# Patient Record
Sex: FEMALE | Race: ASIAN | NOT HISPANIC OR LATINO | Employment: FULL TIME | URBAN - METROPOLITAN AREA
[De-identification: names, ages, dates, MRNs, and addresses within clinical notes are randomized per-mention and may not be internally consistent; named-entity substitution may affect disease eponyms.]

---

## 2021-02-15 ENCOUNTER — CLINICAL SUPPORT (OUTPATIENT)
Dept: OTHER | Facility: CLINIC | Age: 31
End: 2021-02-15

## 2021-02-15 DIAGNOSIS — Z00.8 ENCOUNTER FOR OTHER GENERAL EXAMINATION: ICD-10-CM

## 2021-02-17 VITALS — BODY MASS INDEX: 19.2 KG/M2 | HEIGHT: 62 IN

## 2021-02-17 LAB
HDLC SERPL-MCNC: 67 MG/DL
POC CHOLESTEROL, LDL (DOCK): 112 MG/DL
POC CHOLESTEROL, TOTAL: 199 MG/DL
POC GLUCOSE, FASTING: 101 MG/DL (ref 60–110)
TRIGL SERPL-MCNC: 101 MG/DL

## 2021-04-15 ENCOUNTER — PATIENT MESSAGE (OUTPATIENT)
Dept: RESEARCH | Facility: HOSPITAL | Age: 31
End: 2021-04-15

## 2022-03-22 ENCOUNTER — CLINICAL SUPPORT (OUTPATIENT)
Dept: OTHER | Facility: CLINIC | Age: 32
End: 2022-03-22
Payer: COMMERCIAL

## 2022-03-22 DIAGNOSIS — Z00.8 ENCOUNTER FOR OTHER GENERAL EXAMINATION: ICD-10-CM

## 2022-03-22 PROCEDURE — 99401 PREV MED CNSL INDIV APPRX 15: CPT | Mod: S$GLB,,, | Performed by: INTERNAL MEDICINE

## 2022-03-22 PROCEDURE — 82947 PR  ASSAY QUANTITATIVE,BLOOD GLUCOSE: ICD-10-PCS | Mod: QW,S$GLB,, | Performed by: INTERNAL MEDICINE

## 2022-03-22 PROCEDURE — 82947 ASSAY GLUCOSE BLOOD QUANT: CPT | Mod: QW,S$GLB,, | Performed by: INTERNAL MEDICINE

## 2022-03-22 PROCEDURE — 80061 PR  LIPID PANEL: ICD-10-PCS | Mod: QW,S$GLB,, | Performed by: INTERNAL MEDICINE

## 2022-03-22 PROCEDURE — 99401 PR PREVENT COUNSEL,INDIV,15 MIN: ICD-10-PCS | Mod: S$GLB,,, | Performed by: INTERNAL MEDICINE

## 2022-03-22 PROCEDURE — 80061 LIPID PANEL: CPT | Mod: QW,S$GLB,, | Performed by: INTERNAL MEDICINE

## 2022-03-23 VITALS — HEIGHT: 62 IN | BODY MASS INDEX: 19.2 KG/M2

## 2022-03-23 LAB
HDLC SERPL-MCNC: 61 MG/DL
POC CHOLESTEROL, LDL (DOCK): 80.4 MG/DL
POC CHOLESTEROL, TOTAL: 161 MG/DL
POC GLUCOSE, FASTING: 92 MG/DL (ref 60–110)
TRIGL SERPL-MCNC: 98 MG/DL

## 2023-06-06 ENCOUNTER — TELEPHONE (OUTPATIENT)
Dept: OBSTETRICS AND GYNECOLOGY | Facility: CLINIC | Age: 33
End: 2023-06-06
Payer: COMMERCIAL

## 2023-07-11 ENCOUNTER — OFFICE VISIT (OUTPATIENT)
Dept: OBSTETRICS AND GYNECOLOGY | Facility: CLINIC | Age: 33
End: 2023-07-11
Payer: COMMERCIAL

## 2023-07-11 VITALS
HEIGHT: 62 IN | SYSTOLIC BLOOD PRESSURE: 124 MMHG | DIASTOLIC BLOOD PRESSURE: 79 MMHG | WEIGHT: 114.63 LBS | BODY MASS INDEX: 21.1 KG/M2

## 2023-07-11 DIAGNOSIS — Z01.419 ENCOUNTER FOR ANNUAL ROUTINE GYNECOLOGICAL EXAMINATION: Primary | ICD-10-CM

## 2023-07-11 PROCEDURE — 1160F PR REVIEW ALL MEDS BY PRESCRIBER/CLIN PHARMACIST DOCUMENTED: ICD-10-PCS | Mod: CPTII,S$GLB,, | Performed by: OBSTETRICS & GYNECOLOGY

## 2023-07-11 PROCEDURE — 3074F SYST BP LT 130 MM HG: CPT | Mod: CPTII,S$GLB,, | Performed by: OBSTETRICS & GYNECOLOGY

## 2023-07-11 PROCEDURE — 1160F RVW MEDS BY RX/DR IN RCRD: CPT | Mod: CPTII,S$GLB,, | Performed by: OBSTETRICS & GYNECOLOGY

## 2023-07-11 PROCEDURE — 3074F PR MOST RECENT SYSTOLIC BLOOD PRESSURE < 130 MM HG: ICD-10-PCS | Mod: CPTII,S$GLB,, | Performed by: OBSTETRICS & GYNECOLOGY

## 2023-07-11 PROCEDURE — 3008F BODY MASS INDEX DOCD: CPT | Mod: CPTII,S$GLB,, | Performed by: OBSTETRICS & GYNECOLOGY

## 2023-07-11 PROCEDURE — 99999 PR PBB SHADOW E&M-EST. PATIENT-LVL III: CPT | Mod: PBBFAC,,, | Performed by: OBSTETRICS & GYNECOLOGY

## 2023-07-11 PROCEDURE — 87624 HPV HI-RISK TYP POOLED RSLT: CPT | Performed by: OBSTETRICS & GYNECOLOGY

## 2023-07-11 PROCEDURE — 99385 PREV VISIT NEW AGE 18-39: CPT | Mod: S$GLB,,, | Performed by: OBSTETRICS & GYNECOLOGY

## 2023-07-11 PROCEDURE — 99999 PR PBB SHADOW E&M-EST. PATIENT-LVL III: ICD-10-PCS | Mod: PBBFAC,,, | Performed by: OBSTETRICS & GYNECOLOGY

## 2023-07-11 PROCEDURE — 3078F PR MOST RECENT DIASTOLIC BLOOD PRESSURE < 80 MM HG: ICD-10-PCS | Mod: CPTII,S$GLB,, | Performed by: OBSTETRICS & GYNECOLOGY

## 2023-07-11 PROCEDURE — 1159F PR MEDICATION LIST DOCUMENTED IN MEDICAL RECORD: ICD-10-PCS | Mod: CPTII,S$GLB,, | Performed by: OBSTETRICS & GYNECOLOGY

## 2023-07-11 PROCEDURE — 3078F DIAST BP <80 MM HG: CPT | Mod: CPTII,S$GLB,, | Performed by: OBSTETRICS & GYNECOLOGY

## 2023-07-11 PROCEDURE — 3008F PR BODY MASS INDEX (BMI) DOCUMENTED: ICD-10-PCS | Mod: CPTII,S$GLB,, | Performed by: OBSTETRICS & GYNECOLOGY

## 2023-07-11 PROCEDURE — 1159F MED LIST DOCD IN RCRD: CPT | Mod: CPTII,S$GLB,, | Performed by: OBSTETRICS & GYNECOLOGY

## 2023-07-11 PROCEDURE — 99385 PR PREVENTIVE VISIT,NEW,18-39: ICD-10-PCS | Mod: S$GLB,,, | Performed by: OBSTETRICS & GYNECOLOGY

## 2023-07-11 PROCEDURE — 88175 CYTOPATH C/V AUTO FLUID REDO: CPT | Performed by: PATHOLOGY

## 2023-07-11 NOTE — PROGRESS NOTES
SUBJECTIVE:     Chief Complaint: Well Woman       History of Present Illness:  Annual Exam  Patient presents for annual exam.   She c/o nothing today.  LMP: 23  She denies any vd, vb, dyspareunia, dysuria, depression, anxiety.  Last pap was in unsure and was neg per patient. HPV unsure.  Birth Control: none, declines  declines STD testing.     GYN screening history:  denies  Mammogram history: none  Colonoscopy history: none  Dexa history: none    FH:   Breast cancer: none  Colon cancer: none  Ovarian cancer: none    Review of patient's allergies indicates:  No Known Allergies    History reviewed. No pertinent past medical history.  History reviewed. No pertinent surgical history.  OB History          1    Para   0    Term                AB   1    Living             SAB        IAB        Ectopic        Multiple        Live Births                   History reviewed. No pertinent family history.  Social History     Tobacco Use    Smoking status: Never       No current outpatient medications on file.     No current facility-administered medications for this visit.       Review of Systems:  GENERAL: No fever, chills, fatigability or weight loss.  CARDIOVASCULAR: No chest pain. No palpitations.  RESPIRATORY: No SOB, no wheezing.  BREAST: Denies pain. No lumps. No discharge.  VULVAR: No pain, no lesions and no itching.  VAGINAL: No relaxation, no itching, no discharge, no abnormal bleeding and no lesions.  ABDOMEN: No abdominal pain. Denies nausea. Denies vomiting. No diarrhea. No constipation  URINARY: No incontinence, no nocturia, no frequency and no dysuria.  NEUROLOGICAL: No headaches. No vision changes.       OBJECTIVE:     Vitals:    23 1038   BP: 124/79       Patient verbally consents to pelvic and breast exams.  Physical Exam:  Gen: NAD, well developed, well-nourished  HEENT: Normocephalic, atraumatic  Eyes: EOM nl, conjuntivae normal  Neck: ROM normal, no thyromegaly  Respiratory:  Effort normal   Abd: soft, nontender, no masses palpated  Breast: Normal bilaterally, no masses, lesions or tenderness. No nipple discharge on expression, no lymphadenopathy bilaterally.  SSE:  Vulva: no lesions or rashes  Cervix: No lesions noted, nonfriable, no vaginal discharge or vaginal bleeding noted  BME:   Cervix: No CMT  Adnexa: nl bilaterally, no masses or fullness palpated  Uterus: normal, nonenlarged  Musculoskeletal: normal ROM  Neuro: alert, AAOx3  Skin: warm and dry  Psych: mood/affect nl, behavior normal, judgement normal, thought content normal        ASSESSMENT:       ICD-10-CM ICD-9-CM    1. Encounter for annual routine gynecological examination  Z01.419 V72.31 Liquid-Based Pap Smear, Screening      HPV High Risk Genotypes, PCR             Plan:      Marcella was seen today for well woman.    Diagnoses and all orders for this visit:    Encounter for annual routine gynecological examination  -     Liquid-Based Pap Smear, Screening  -     HPV High Risk Genotypes, PCR        Orders Placed This Encounter   Procedures    HPV High Risk Genotypes, PCR     Taking daily vitamin.   Follow up in one year for annual, or prn.    Julie R Jeansonne

## 2023-07-14 LAB
HPV HR 12 DNA SPEC QL NAA+PROBE: NEGATIVE
HPV16 AG SPEC QL: NEGATIVE
HPV18 DNA SPEC QL NAA+PROBE: NEGATIVE

## 2023-07-19 LAB
COMMENT: ABNORMAL
FINAL PATHOLOGIC DIAGNOSIS: ABNORMAL
Lab: ABNORMAL

## 2023-07-20 ENCOUNTER — TELEPHONE (OUTPATIENT)
Dept: OBSTETRICS AND GYNECOLOGY | Facility: CLINIC | Age: 33
End: 2023-07-20
Payer: COMMERCIAL

## 2023-08-02 ENCOUNTER — TELEPHONE (OUTPATIENT)
Dept: OBSTETRICS AND GYNECOLOGY | Facility: CLINIC | Age: 33
End: 2023-08-02
Payer: COMMERCIAL

## 2023-08-02 NOTE — TELEPHONE ENCOUNTER
Spoke with pt and got her RS to 9/18 @1:15 for her Colpo. Pt VU      ----- Message from Lali Samuel sent at 8/2/2023  8:24 AM CDT -----  Contact: MARK PAK [[id number]]  Type: Call Back      Who called: MARK PAK [[id number]]      What is the request in detail: Patient is requesting a call back in regard to rescheduling her 08/07 procedure.   Please advise.     Can the clinic reply by [facility]? Yes      Would the patient rather a call back or a response via [facility]? Call back       Best call back number: [phone] (home)       Additional Information:

## 2023-09-18 ENCOUNTER — PROCEDURE VISIT (OUTPATIENT)
Dept: OBSTETRICS AND GYNECOLOGY | Facility: CLINIC | Age: 33
End: 2023-09-18
Payer: COMMERCIAL

## 2023-09-18 VITALS
DIASTOLIC BLOOD PRESSURE: 66 MMHG | BODY MASS INDEX: 21.51 KG/M2 | WEIGHT: 116.88 LBS | HEIGHT: 62 IN | SYSTOLIC BLOOD PRESSURE: 111 MMHG

## 2023-09-18 DIAGNOSIS — R87.613 HSIL (HIGH GRADE SQUAMOUS INTRAEPITHELIAL LESION) ON PAP SMEAR OF CERVIX: Primary | ICD-10-CM

## 2023-09-18 PROCEDURE — 88305 TISSUE EXAM BY PATHOLOGIST: ICD-10-PCS | Mod: 26,,, | Performed by: PATHOLOGY

## 2023-09-18 PROCEDURE — 57454 COLPOSCOPY: ICD-10-PCS | Mod: S$GLB,,, | Performed by: OBSTETRICS & GYNECOLOGY

## 2023-09-18 PROCEDURE — 57454 BX/CURETT OF CERVIX W/SCOPE: CPT | Mod: S$GLB,,, | Performed by: OBSTETRICS & GYNECOLOGY

## 2023-09-18 PROCEDURE — 88305 TISSUE EXAM BY PATHOLOGIST: CPT | Mod: 59 | Performed by: PATHOLOGY

## 2023-09-18 PROCEDURE — 88305 TISSUE EXAM BY PATHOLOGIST: CPT | Mod: 26,,, | Performed by: PATHOLOGY

## 2023-09-18 NOTE — PROCEDURES
"Marcella CORDOVA Do is a 32 y.o. female patient.    BP: 111/66 (09/18/23 1317)  Weight: 53 kg (116 lb 13.5 oz) (09/18/23 1317)  Height: 5' 2" (157.5 cm) (09/18/23 1317)       Colposcopy    Date/Time: 9/18/2023 1:15 PM    Performed by: Jeansonne, Julie R., MD  Authorized by: Jeansonne, Julie R., MD    Consent Done?:  Yes (Written)  Timeout:Immediately prior to procedure a time out was called to verify the correct patient, procedure, equipment, support staff and site/side marked as required  Prep:Patient was prepped and draped in the usual sterile fashion  Assistants?: Yes    List of assistants:  Vivi HAYNES was present for the entire procedure.    Colposcopy Site:  Cervix  Position:  Supine   Patient was prepped and draped in the normal sterile fashion.  Acrowhite Lesion: No    Atypical Vessels: No    Transformation Zone Adequate?: Yes    Biopsy?: Yes         Location:  Cervix ((4 00))  ECC Performed?: Yes    LEEP Performed?: No    Estimated blood loss (cc):  1   Patient tolerated the procedure well with no immediate complications.   Post-operative instructions were provided for the patient.   Patient was discharged and will follow up if any complications occur     HSIL, HPV neg      9/18/2023    "

## 2023-09-22 LAB
FINAL PATHOLOGIC DIAGNOSIS: NORMAL
Lab: NORMAL

## 2023-09-29 ENCOUNTER — TELEPHONE (OUTPATIENT)
Dept: OBSTETRICS AND GYNECOLOGY | Facility: CLINIC | Age: 33
End: 2023-09-29
Payer: COMMERCIAL

## 2023-09-29 NOTE — TELEPHONE ENCOUNTER
Attempted to call pt in regards to scheduling LEEP no answer LVM     ----- Message from Julie R. Jeansonne, MD sent at 9/28/2023  4:41 PM CDT -----  Regarding: LEEP  Needs LEE

## 2023-12-18 ENCOUNTER — PROCEDURE VISIT (OUTPATIENT)
Dept: OBSTETRICS AND GYNECOLOGY | Facility: CLINIC | Age: 33
End: 2023-12-18
Payer: COMMERCIAL

## 2023-12-18 VITALS
WEIGHT: 114.19 LBS | BODY MASS INDEX: 21.02 KG/M2 | SYSTOLIC BLOOD PRESSURE: 108 MMHG | HEIGHT: 62 IN | DIASTOLIC BLOOD PRESSURE: 70 MMHG

## 2023-12-18 DIAGNOSIS — D06.9 HIGH GRADE SQUAMOUS INTRAEPITHELIAL LESION (HGSIL), GRADE 3 CIN, ON BIOPSY OF CERVIX: Primary | ICD-10-CM

## 2023-12-18 PROCEDURE — 88305 TISSUE EXAM BY PATHOLOGIST: ICD-10-PCS | Mod: 26,,, | Performed by: PATHOLOGY

## 2023-12-18 PROCEDURE — 88307 TISSUE EXAM BY PATHOLOGIST: CPT | Mod: 26,,, | Performed by: PATHOLOGY

## 2023-12-18 PROCEDURE — 88305 TISSUE EXAM BY PATHOLOGIST: CPT | Mod: 26,,, | Performed by: PATHOLOGY

## 2023-12-18 PROCEDURE — 88307 TISSUE EXAM BY PATHOLOGIST: CPT | Performed by: PATHOLOGY

## 2023-12-18 PROCEDURE — 57522 CONIZATION OF CERVIX: CPT | Mod: S$GLB,,, | Performed by: OBSTETRICS & GYNECOLOGY

## 2023-12-18 PROCEDURE — 88305 TISSUE EXAM BY PATHOLOGIST: CPT | Performed by: PATHOLOGY

## 2023-12-18 PROCEDURE — 57522 PR CONIZATION CERVIX,LOOP ELECTRD: ICD-10-PCS | Mod: S$GLB,,, | Performed by: OBSTETRICS & GYNECOLOGY

## 2023-12-18 PROCEDURE — 88307 PR  SURG PATH,LEVEL V: ICD-10-PCS | Mod: 26,,, | Performed by: PATHOLOGY

## 2023-12-18 NOTE — PROCEDURES
Procedure: LEEP    Date: 23    Dx: LYNETTE 3    Pt was consented for LEEP procedure and all risks and benefits reviewed including pain, infection, bleeding, need for another procedure, risk of incomplete treatment, risk of  labor and cervical insufficiency with future pregnancies . Verbal and Written Consent obtained. UCG negative. Pt verbally consented for pelvic exam.     Pt was placed in the dorsal lithotomy position and legs were placed in stirrups. Insulated speculum and sidewall retractor placed with good visualization of cervix and transformation zone. Cervix was then prepped with betadine. 20cc of lidocaine with epinephrine were injected submucosally into the surface of the cervix at 12,3,6,and 9 o clock. After good analgesia was tested and noted, a small wide angle loop electrode was used on the cut setting to excise the transformation zone. 2 samples of tissue were removed and sent to pathology. The ball electrode was then used to cauterize any bleeding tissue noted and monsel's was applied with good hemostasis. 100cc blood loss noted. The side wall retractor and speculum were then removed.     The patient tolerated the procedure well and instructions were given for aftercare and follow up in 4 weeks. All questions were answered with instructions to take with Motrin for relief. Kourtney norco.     Julie R Jeansonne, MD

## 2023-12-26 LAB
FINAL PATHOLOGIC DIAGNOSIS: NORMAL
GROSS: NORMAL
Lab: NORMAL

## 2024-01-04 ENCOUNTER — OFFICE VISIT (OUTPATIENT)
Dept: OBSTETRICS AND GYNECOLOGY | Facility: CLINIC | Age: 34
End: 2024-01-04
Payer: COMMERCIAL

## 2024-01-04 VITALS — BODY MASS INDEX: 20.98 KG/M2 | HEIGHT: 62 IN | WEIGHT: 114 LBS

## 2024-01-04 DIAGNOSIS — D06.9 HIGH GRADE SQUAMOUS INTRAEPITHELIAL LESION (HGSIL), GRADE 3 CIN, ON BIOPSY OF CERVIX: Primary | ICD-10-CM

## 2024-01-04 DIAGNOSIS — Z71.2 ENCOUNTER TO DISCUSS TEST RESULTS: ICD-10-CM

## 2024-01-04 PROCEDURE — 1160F RVW MEDS BY RX/DR IN RCRD: CPT | Mod: CPTII,95,, | Performed by: OBSTETRICS & GYNECOLOGY

## 2024-01-04 PROCEDURE — 1159F MED LIST DOCD IN RCRD: CPT | Mod: CPTII,95,, | Performed by: OBSTETRICS & GYNECOLOGY

## 2024-01-04 PROCEDURE — 99024 POSTOP FOLLOW-UP VISIT: CPT | Mod: 95,,, | Performed by: OBSTETRICS & GYNECOLOGY

## 2024-01-04 PROCEDURE — 3008F BODY MASS INDEX DOCD: CPT | Mod: CPTII,95,, | Performed by: OBSTETRICS & GYNECOLOGY

## 2024-01-04 NOTE — PROGRESS NOTES
The patient location is: home  The chief complaint leading to consultation is: f/u results    Visit type: audiovisual    Face to Face time with patient: 10  20 minutes of total time spent on the encounter, which includes face to face time and non-face to face time preparing to see the patient (eg, review of tests), Obtaining and/or reviewing separately obtained history, Documenting clinical information in the electronic or other health record, Independently interpreting results (not separately reported) and communicating results to the patient/family/caregiver, or Care coordination (not separately reported).         Each patient to whom he or she provides medical services by telemedicine is:  (1) informed of the relationship between the physician and patient and the respective role of any other health care provider with respect to management of the patient; and (2) notified that he or she may decline to receive medical services by telemedicine and may withdraw from such care at any time.    Notes:     CC: f/u LEEP results    Marcella CORDOVA Do is a 33 y.o. female  presents with complaint of above. Had LEEP 2.5 weeks ago for LYNETTE 3. Path resulted LYNETTE 3 positive margins. Here to discuss next steps today. Has been having increase in VD with some redness. No pain.     History reviewed. No pertinent past medical history.  History reviewed. No pertinent surgical history.  No current outpatient medications on file.  Review of patient's allergies indicates:   Allergen Reactions    Acetaminophen Hives     Social History     Tobacco Use    Smoking status: Never     Passive exposure: Never   Substance Use Topics    Alcohol use: Yes    Drug use: Not Currently     History reviewed. No pertinent family history.  OB History    Para Term  AB Living   1 0     1     SAB IAB Ectopic Multiple Live Births                  # Outcome Date GA Lbr Ranjeet/2nd Weight Sex Delivery Anes PTL Lv   1 AB                     ROS:  GENERAL:  No fever, chills, fatigability or weight loss.  VULVAR: No pain, no lesions and no itching.  VAGINAL: No relaxation, no itching, no discharge, no abnormal bleeding and no lesions.  ABDOMEN: No abdominal pain. Denies nausea. Denies vomiting. No diarrhea. No constipation  BREAST: Denies pain. No lumps. No discharge.  URINARY: No incontinence, no nocturia, no frequency and no dysuria.  CARDIOVASCULAR: No chest pain. No shortness of breath. No leg cramps.  NEUROLOGICAL: No headaches. No vision changes.    There were no vitals filed for this visit.    PHYSICAL EXAM:  GEN: NAD  Resp: nl effort  Psych: nl affect  Neuro: no focal deficits    ASSESSMENT and PLAN:    ICD-10-CM ICD-9-CM    1. High grade squamous intraepithelial lesion (HGSIL), grade 3 LYNETTE, on biopsy of cervix  D06.9 233.1         Discussed rec for CKC in the OR at least 6 weeks after LEEP. Discussed R/B/A of procedure. Recommend doing in February. Pt not available until May, will plan for May 20th for now but let me know if she can do sooner. Will f/u 2 weeks for postop exam from LEEP.     FOLLOW UP: PRN lack of improvement.

## 2024-01-11 ENCOUNTER — PATIENT MESSAGE (OUTPATIENT)
Dept: OBSTETRICS AND GYNECOLOGY | Facility: CLINIC | Age: 34
End: 2024-01-11
Payer: COMMERCIAL

## 2024-01-23 ENCOUNTER — OFFICE VISIT (OUTPATIENT)
Dept: OBSTETRICS AND GYNECOLOGY | Facility: CLINIC | Age: 34
End: 2024-01-23
Payer: COMMERCIAL

## 2024-01-23 ENCOUNTER — PATIENT MESSAGE (OUTPATIENT)
Dept: OBSTETRICS AND GYNECOLOGY | Facility: CLINIC | Age: 34
End: 2024-01-23

## 2024-01-23 VITALS
WEIGHT: 116.38 LBS | BODY MASS INDEX: 21.42 KG/M2 | SYSTOLIC BLOOD PRESSURE: 112 MMHG | HEIGHT: 62 IN | DIASTOLIC BLOOD PRESSURE: 75 MMHG

## 2024-01-23 DIAGNOSIS — Z09 POSTOPERATIVE EXAMINATION: ICD-10-CM

## 2024-01-23 DIAGNOSIS — D06.9 HIGH GRADE SQUAMOUS INTRAEPITHELIAL LESION (HGSIL), GRADE 3 CIN, ON BIOPSY OF CERVIX: Primary | ICD-10-CM

## 2024-01-23 DIAGNOSIS — Z98.890 S/P LEEP OF CERVIX: ICD-10-CM

## 2024-01-23 PROCEDURE — 1159F MED LIST DOCD IN RCRD: CPT | Mod: CPTII,S$GLB,, | Performed by: OBSTETRICS & GYNECOLOGY

## 2024-01-23 PROCEDURE — 1160F RVW MEDS BY RX/DR IN RCRD: CPT | Mod: CPTII,S$GLB,, | Performed by: OBSTETRICS & GYNECOLOGY

## 2024-01-23 PROCEDURE — 3008F BODY MASS INDEX DOCD: CPT | Mod: CPTII,S$GLB,, | Performed by: OBSTETRICS & GYNECOLOGY

## 2024-01-23 PROCEDURE — 3074F SYST BP LT 130 MM HG: CPT | Mod: CPTII,S$GLB,, | Performed by: OBSTETRICS & GYNECOLOGY

## 2024-01-23 PROCEDURE — 99024 POSTOP FOLLOW-UP VISIT: CPT | Mod: S$GLB,,, | Performed by: OBSTETRICS & GYNECOLOGY

## 2024-01-23 PROCEDURE — 3078F DIAST BP <80 MM HG: CPT | Mod: CPTII,S$GLB,, | Performed by: OBSTETRICS & GYNECOLOGY

## 2024-01-23 PROCEDURE — 99999 PR PBB SHADOW E&M-EST. PATIENT-LVL III: CPT | Mod: PBBFAC,,, | Performed by: OBSTETRICS & GYNECOLOGY

## 2024-01-23 RX ORDER — SODIUM CHLORIDE 9 MG/ML
INJECTION, SOLUTION INTRAVENOUS CONTINUOUS
Status: CANCELLED | OUTPATIENT
Start: 2024-01-23

## 2024-01-23 RX ORDER — MUPIROCIN 20 MG/G
OINTMENT TOPICAL
Status: CANCELLED | OUTPATIENT
Start: 2024-01-23

## 2024-01-23 RX ORDER — FAMOTIDINE 20 MG/1
20 TABLET, FILM COATED ORAL
Status: CANCELLED | OUTPATIENT
Start: 2024-01-23

## 2024-01-23 NOTE — PROGRESS NOTES
Marcella CORDOVA Do is a 33 y.o. female, postop 1 month s/p LEEP for LYNETTE 3 who presents for routine follow up. She has no complaints today. She denies any pain, VB, purulent VD, n/v/d/c. + margins, planning for CKC. Wants in March now.     Pathology: LYNETTE 3 positive margins    ROS: No Abd/vaginal pain, VB, VD, fevers, chills, nausea, vomiting, diarrhea    PE:  Vitals:    01/23/24 0731   BP: 112/75     Gen: NAD  Resp: nl effort  Abd: Soft, NT, Incision: soft and dry, no erythema or edema, no drainage  Pelvic: SSE: cervix well healed, physiologic VD noted, no purulent drainage, no VB  Skin: warm and dry  Psych: nl affect  Neuro: Cns intact    A/P:  1) Routine Postop visit s/p LEEP  -Patient doing well, follow up PRN. Precautions given.   -plan for CKC 3/18. Yuan sign consents in preop. R/B/A discussed.

## 2024-02-27 ENCOUNTER — PATIENT MESSAGE (OUTPATIENT)
Dept: OBSTETRICS AND GYNECOLOGY | Facility: CLINIC | Age: 34
End: 2024-02-27
Payer: COMMERCIAL

## 2024-03-11 ENCOUNTER — ANESTHESIA EVENT (OUTPATIENT)
Dept: SURGERY | Facility: OTHER | Age: 34
End: 2024-03-11
Payer: COMMERCIAL

## 2024-03-11 RX ORDER — SODIUM CHLORIDE, SODIUM LACTATE, POTASSIUM CHLORIDE, CALCIUM CHLORIDE 600; 310; 30; 20 MG/100ML; MG/100ML; MG/100ML; MG/100ML
INJECTION, SOLUTION INTRAVENOUS CONTINUOUS
Status: CANCELLED | OUTPATIENT
Start: 2024-03-11

## 2024-03-11 RX ORDER — PREGABALIN 75 MG/1
75 CAPSULE ORAL ONCE
Status: CANCELLED | OUTPATIENT
Start: 2024-03-11 | End: 2024-03-11

## 2024-03-11 RX ORDER — LIDOCAINE HYDROCHLORIDE 10 MG/ML
0.5 INJECTION, SOLUTION EPIDURAL; INFILTRATION; INTRACAUDAL; PERINEURAL ONCE
Status: CANCELLED | OUTPATIENT
Start: 2024-03-11 | End: 2024-03-11

## 2024-03-12 ENCOUNTER — HOSPITAL ENCOUNTER (OUTPATIENT)
Dept: PREADMISSION TESTING | Facility: OTHER | Age: 34
Discharge: HOME | End: 2024-03-12
Attending: OBSTETRICS & GYNECOLOGY
Payer: COMMERCIAL

## 2024-03-12 VITALS
HEART RATE: 65 BPM | OXYGEN SATURATION: 100 % | DIASTOLIC BLOOD PRESSURE: 59 MMHG | HEIGHT: 62 IN | BODY MASS INDEX: 21.16 KG/M2 | SYSTOLIC BLOOD PRESSURE: 109 MMHG | TEMPERATURE: 97 F | WEIGHT: 115 LBS | RESPIRATION RATE: 17 BRPM

## 2024-03-12 DIAGNOSIS — D06.9 HIGH GRADE SQUAMOUS INTRAEPITHELIAL LESION (HGSIL), GRADE 3 CIN, ON BIOPSY OF CERVIX: ICD-10-CM

## 2024-03-12 DIAGNOSIS — Z98.890 S/P LEEP OF CERVIX: ICD-10-CM

## 2024-03-12 LAB
BASOPHILS # BLD AUTO: 0.04 K/UL (ref 0–0.2)
BASOPHILS NFR BLD: 0.8 % (ref 0–1.9)
DIFFERENTIAL METHOD BLD: NORMAL
EOSINOPHIL # BLD AUTO: 0.1 K/UL (ref 0–0.5)
EOSINOPHIL NFR BLD: 1 % (ref 0–8)
ERYTHROCYTE [DISTWIDTH] IN BLOOD BY AUTOMATED COUNT: 12.4 % (ref 11.5–14.5)
HCT VFR BLD AUTO: 40.2 % (ref 37–48.5)
HGB BLD-MCNC: 12.9 G/DL (ref 12–16)
IMM GRANULOCYTES # BLD AUTO: 0.01 K/UL (ref 0–0.04)
IMM GRANULOCYTES NFR BLD AUTO: 0.2 % (ref 0–0.5)
LYMPHOCYTES # BLD AUTO: 1.9 K/UL (ref 1–4.8)
LYMPHOCYTES NFR BLD: 39.7 % (ref 18–48)
MCH RBC QN AUTO: 29.6 PG (ref 27–31)
MCHC RBC AUTO-ENTMCNC: 32.1 G/DL (ref 32–36)
MCV RBC AUTO: 92 FL (ref 82–98)
MONOCYTES # BLD AUTO: 0.4 K/UL (ref 0.3–1)
MONOCYTES NFR BLD: 7.5 % (ref 4–15)
NEUTROPHILS # BLD AUTO: 2.4 K/UL (ref 1.8–7.7)
NEUTROPHILS NFR BLD: 50.8 % (ref 38–73)
NRBC BLD-RTO: 0 /100 WBC
PLATELET # BLD AUTO: 320 K/UL (ref 150–450)
PMV BLD AUTO: 9.8 FL (ref 9.2–12.9)
RBC # BLD AUTO: 4.36 M/UL (ref 4–5.4)
WBC # BLD AUTO: 4.79 K/UL (ref 3.9–12.7)

## 2024-03-12 PROCEDURE — 36415 COLL VENOUS BLD VENIPUNCTURE: CPT | Performed by: OBSTETRICS & GYNECOLOGY

## 2024-03-12 PROCEDURE — 85025 COMPLETE CBC W/AUTO DIFF WBC: CPT | Performed by: OBSTETRICS & GYNECOLOGY

## 2024-03-12 RX ORDER — IBUPROFEN 800 MG/1
800 TABLET ORAL EVERY 8 HOURS PRN
COMMUNITY
Start: 2024-01-21

## 2024-03-12 NOTE — DISCHARGE INSTRUCTIONS
Information to Prepare you for your Surgery    PRE-ADMIT TESTING -  [phone]    [address]  [facility]          Your surgery has been scheduled at [facility]. We are pleased to have the opportunity to serve you. For Further Information please call [phone].    On the day of surgery please report to the Information Desk on the 1st floor.    CONTACT YOUR PHYSICIAN'S OFFICE THE DAY PRIOR TO YOUR SURGERY TO OBTAIN YOUR ARRIVAL TIME.     The evening before surgery do not eat anything after 9 p.m. ( this includes hard candy, chewing gum and mints).  You may only have GATORADE, POWERADE AND WATER  from 9 p.m. until you leave your home.   DO NOT DRINK ANY LIQUIDS ON THE WAY TO THE HOSPITAL.      Why does your anesthesiologist allow you to drink Gatorade/Powerade before surgery?  Gatorade/Powerade helps to increase your comfort before surgery and to decrease your nausea after surgery. The carbohydrates in Gatorade/Powerade help reduce your body's stress response to surgery.  If you are a diabetic-drink only water prior to surgery.    Outpatient Surgery- May allow 2 adult (18 and older) Support Persons (1 being the designated ) for all surgical/procedural patients. A breastfeeding mother will be allowed her infant and 2 adult Support Persons. No one under the age of 18 will be allowed in the building. No swapping out of visitors in the [facility].      SPECIAL MEDICATION INSTRUCTIONS: TAKE medications checked off by the Anesthesiologist on your Medication List.    Angiogram Patients: Take medications as instructed by your physician, including aspirin.     Surgery Patients:    If you take ASPIRIN - Your PHYSICIAN/SURGEON will need to inform you IF/OR when you need to stop taking aspirin prior to your surgery.     The week prior to surgery do not ot take any medications containing IBUPROFEN or NSAIDS ( Advil, Motrin, Goodys, BC, Aleve, Naproxen etc)  If you are not sure if you should take a medicine please call your surgeon's office.      Do Not Wear any make-up (especially eye make-up) to surgery. Please remove any false eyelashes or eyelash extensions. If you arrive the day of surgery with makeup/eyelashes on you will be required to remove prior to surgery. (There is a risk of corneal abrasions if eye makeup/eyelash extensions are not removed)      Leave all valuables at home.   Do Not wear any jewelry or watches, including any metal in body piercings. Jewelry must be removed prior to coming to the hospital.  There is a possibility that rings that are unable to be removed may be cut off if they are on the surgical extremity.    Please remove all hair extensions, wigs, clips and any other metal accessories/ ornaments from your hair.  These items may pose a flammable/fire risk in Surgery and must be removed.    Do not shave your surgical area at least 5 days prior to your surgery. The surgical prep will be performed at the hospital according to Infection Control regulations.    Contact Lens must be removed before surgery. Either do not wear the contact lens or bring a case and solution for storage.  Please bring a container for eyeglasses or dentures as required.  Bring any paperwork your physician has provided, such as consent forms,  history and physicals, doctor's orders, etc.   Bring comfortable clothes that are loose fitting to wear upon discharge. Take into consideration the type of surgery being performed.  Maintain your diet as advised per your physician the day prior to surgery.      Adequate rest the night before surgery is advised.   Park in the Parking lot behind the hospital or in the [facility] across the street from the parking lot. Parking is complimentary.  If you will be discharged the same day as your procedure, please arrange for a responsible adult to drive you home or to accompany you if traveling by taxi.   YOU WILL NOT BE  PERMITTED TO DRIVE OR TO LEAVE THE HOSPITAL ALONE AFTER SURGERY.   If you are being discharged the same day, it is strongly recommended that you arrange for someone to remain with you for the first 24 hrs following your surgery.    The Surgeon will speak to your family/visitor after your surgery regarding the outcome of your surgery and post op care.  The Surgeon may speak to you after your surgery, but there is a possibility you may not remember the details.  Please check with your family members regarding the conversation with the Surgeon.    We strongly recommend whoever is bringing you home be present for discharge instructions.  This will ensure a thorough understanding for your post op home care.          Thank you for your cooperation.  The Staff of [facility].            Bathing Instructions with Hibiclens    Shower the evening before and morning of your procedure with Chlorhexidine (Hibiclens)  do not use Chlorhexidine on your face or genitals. Do not get in your eyes.  Wash your face with water and your regular face wash/soap  Use your regular shampoo  Apply Chlorhexidine (Hibiclens) directly on your skin or on a wet washcloth and wash gently. When showering: Move away from the shower stream when applying Chlorhexidine (Hibiclens) to avoid rinsing off too soon.  Rinse thoroughly with warm water  Do not dilute Chlorhexidine (Hibiclens)   Dry off as usual, do not use any deodorant, powder, body lotions, perfume, after shave or cologne.

## 2024-03-12 NOTE — ANESTHESIA PREPROCEDURE EVALUATION
03/12/2024  Marcella Plunkett Do is a 33 y.o., female.      Pre-op Assessment    I have reviewed the Patient Summary Reports.     I have reviewed the Nursing Notes. I have reviewed the NPO Status.   I have reviewed the Medications.     Review of Systems  Anesthesia Hx:  No problems with previous Anesthesia             Denies Family Hx of Anesthesia complications.    Denies Personal Hx of Anesthesia complications.                    Social:  Non-Smoker       Hematology/Oncology:  Hematology Normal   Oncology Normal                                   EENT/Dental:  EENT/Dental Normal           Cardiovascular:  Cardiovascular Normal                                            Pulmonary:  Pulmonary Normal                       Renal/:  Renal/ Normal                 Hepatic/GI:  Hepatic/GI Normal                 Musculoskeletal:  Musculoskeletal Normal                Neurological:  Neurology Normal                                      Endocrine:  Endocrine Normal            Dermatological:  Skin Normal    Psych:  Psychiatric Normal                  Physical Exam  General: Well nourished and Alert    Airway:  Mallampati: II   Mouth Opening: Normal  Tongue: Normal    Dental:  Intact      Anesthesia Plan  Type of Anesthesia, risks & benefits discussed:    Anesthesia Type: Gen Supraglottic Airway  Intra-op Monitoring Plan: Standard ASA Monitors  Post Op Pain Control Plan: multimodal analgesia  Induction:  IV  Airway Plan: Video  Informed Consent: Informed consent signed with the Patient and all parties understand the risks and agree with anesthesia plan.  All questions answered.   ASA Score: 1  Anesthesia Plan Notes: Healthy    Ready For Surgery From Anesthesia Perspective.     .

## 2024-03-15 ENCOUNTER — PATIENT MESSAGE (OUTPATIENT)
Dept: OBSTETRICS AND GYNECOLOGY | Facility: CLINIC | Age: 34
End: 2024-03-15
Payer: COMMERCIAL

## 2024-03-18 ENCOUNTER — ANESTHESIA (OUTPATIENT)
Dept: SURGERY | Facility: OTHER | Age: 34
End: 2024-03-18
Payer: COMMERCIAL

## 2024-03-18 ENCOUNTER — HOSPITAL ENCOUNTER (OUTPATIENT)
Facility: OTHER | Age: 34
Discharge: HOME | End: 2024-03-18
Attending: OBSTETRICS & GYNECOLOGY | Admitting: OBSTETRICS & GYNECOLOGY
Payer: COMMERCIAL

## 2024-03-18 DIAGNOSIS — Z98.890 S/P CONIZATION OF CERVIX: Primary | ICD-10-CM

## 2024-03-18 DIAGNOSIS — D06.9 HIGH GRADE SQUAMOUS INTRAEPITHELIAL LESION (HGSIL), GRADE 3 CIN, ON BIOPSY OF CERVIX: ICD-10-CM

## 2024-03-18 DIAGNOSIS — Z98.890 S/P LEEP OF CERVIX: ICD-10-CM

## 2024-03-18 LAB
B-HCG UR QL: NEGATIVE
CTP QC/QA: YES

## 2024-03-18 PROCEDURE — 25000003 PHARM REV CODE 250: Performed by: ANESTHESIOLOGY

## 2024-03-18 PROCEDURE — 63600175 PHARM REV CODE 636 W HCPCS: Performed by: ANESTHESIOLOGY

## 2024-03-18 PROCEDURE — 36000706: Performed by: OBSTETRICS & GYNECOLOGY

## 2024-03-18 PROCEDURE — D9220A PRA ANESTHESIA: Mod: CRNA,,, | Performed by: STUDENT IN AN ORGANIZED HEALTH CARE EDUCATION/TRAINING PROGRAM

## 2024-03-18 PROCEDURE — D9220A PRA ANESTHESIA: Mod: ANES,,, | Performed by: ANESTHESIOLOGY

## 2024-03-18 PROCEDURE — 63600175 PHARM REV CODE 636 W HCPCS: Performed by: STUDENT IN AN ORGANIZED HEALTH CARE EDUCATION/TRAINING PROGRAM

## 2024-03-18 PROCEDURE — 25000003 PHARM REV CODE 250: Performed by: OBSTETRICS & GYNECOLOGY

## 2024-03-18 PROCEDURE — 71000033 HC RECOVERY, INTIAL HOUR: Performed by: OBSTETRICS & GYNECOLOGY

## 2024-03-18 PROCEDURE — 37000009 HC ANESTHESIA EA ADD 15 MINS: Performed by: OBSTETRICS & GYNECOLOGY

## 2024-03-18 PROCEDURE — 63600175 PHARM REV CODE 636 W HCPCS: Mod: JZ,JG | Performed by: OBSTETRICS & GYNECOLOGY

## 2024-03-18 PROCEDURE — 71000016 HC POSTOP RECOV ADDL HR: Performed by: OBSTETRICS & GYNECOLOGY

## 2024-03-18 PROCEDURE — 25000003 PHARM REV CODE 250: Performed by: STUDENT IN AN ORGANIZED HEALTH CARE EDUCATION/TRAINING PROGRAM

## 2024-03-18 PROCEDURE — 36000707: Performed by: OBSTETRICS & GYNECOLOGY

## 2024-03-18 PROCEDURE — 81025 URINE PREGNANCY TEST: CPT | Performed by: ANESTHESIOLOGY

## 2024-03-18 PROCEDURE — 88307 TISSUE EXAM BY PATHOLOGIST: CPT | Performed by: PATHOLOGY

## 2024-03-18 PROCEDURE — 57520 CONIZATION OF CERVIX: CPT | Mod: ,,, | Performed by: OBSTETRICS & GYNECOLOGY

## 2024-03-18 PROCEDURE — 88307 TISSUE EXAM BY PATHOLOGIST: CPT | Mod: 26,,, | Performed by: PATHOLOGY

## 2024-03-18 PROCEDURE — 71000015 HC POSTOP RECOV 1ST HR: Performed by: OBSTETRICS & GYNECOLOGY

## 2024-03-18 PROCEDURE — 37000008 HC ANESTHESIA 1ST 15 MINUTES: Performed by: OBSTETRICS & GYNECOLOGY

## 2024-03-18 RX ORDER — HYDROMORPHONE HYDROCHLORIDE 2 MG/ML
0.4 INJECTION, SOLUTION INTRAMUSCULAR; INTRAVENOUS; SUBCUTANEOUS EVERY 5 MIN PRN
Status: DISCONTINUED | OUTPATIENT
Start: 2024-03-18 | End: 2024-03-18 | Stop reason: HOSPADM

## 2024-03-18 RX ORDER — MUPIROCIN 20 MG/G
OINTMENT TOPICAL
Status: DISCONTINUED | OUTPATIENT
Start: 2024-03-18 | End: 2024-03-18 | Stop reason: HOSPADM

## 2024-03-18 RX ORDER — OXYCODONE HYDROCHLORIDE 5 MG/1
5 TABLET ORAL EVERY 4 HOURS PRN
Status: DISCONTINUED | OUTPATIENT
Start: 2024-03-18 | End: 2024-03-18 | Stop reason: HOSPADM

## 2024-03-18 RX ORDER — PROPOFOL 10 MG/ML
VIAL (ML) INTRAVENOUS
Status: DISCONTINUED | OUTPATIENT
Start: 2024-03-18 | End: 2024-03-18

## 2024-03-18 RX ORDER — SODIUM CHLORIDE, SODIUM LACTATE, POTASSIUM CHLORIDE, CALCIUM CHLORIDE 600; 310; 30; 20 MG/100ML; MG/100ML; MG/100ML; MG/100ML
INJECTION, SOLUTION INTRAVENOUS CONTINUOUS
Status: DISCONTINUED | OUTPATIENT
Start: 2024-03-18 | End: 2024-03-18 | Stop reason: HOSPADM

## 2024-03-18 RX ORDER — DEXAMETHASONE SODIUM PHOSPHATE 4 MG/ML
INJECTION, SOLUTION INTRA-ARTICULAR; INTRALESIONAL; INTRAMUSCULAR; INTRAVENOUS; SOFT TISSUE
Status: DISCONTINUED | OUTPATIENT
Start: 2024-03-18 | End: 2024-03-18

## 2024-03-18 RX ORDER — SODIUM CHLORIDE 0.9 % (FLUSH) 0.9 %
3 SYRINGE (ML) INJECTION
Status: DISCONTINUED | OUTPATIENT
Start: 2024-03-18 | End: 2024-03-18 | Stop reason: HOSPADM

## 2024-03-18 RX ORDER — MEPERIDINE HYDROCHLORIDE 25 MG/ML
12.5 INJECTION INTRAMUSCULAR; INTRAVENOUS; SUBCUTANEOUS ONCE AS NEEDED
Status: DISCONTINUED | OUTPATIENT
Start: 2024-03-18 | End: 2024-03-18 | Stop reason: HOSPADM

## 2024-03-18 RX ORDER — FENTANYL CITRATE 50 UG/ML
INJECTION, SOLUTION INTRAMUSCULAR; INTRAVENOUS
Status: DISCONTINUED | OUTPATIENT
Start: 2024-03-18 | End: 2024-03-18

## 2024-03-18 RX ORDER — DIPHENHYDRAMINE HYDROCHLORIDE 50 MG/ML
12.5 INJECTION INTRAMUSCULAR; INTRAVENOUS EVERY 30 MIN PRN
Status: DISCONTINUED | OUTPATIENT
Start: 2024-03-18 | End: 2024-03-18 | Stop reason: HOSPADM

## 2024-03-18 RX ORDER — LIDOCAINE HYDROCHLORIDE 20 MG/ML
INJECTION INTRAVENOUS
Status: DISCONTINUED | OUTPATIENT
Start: 2024-03-18 | End: 2024-03-18

## 2024-03-18 RX ORDER — KETOROLAC TROMETHAMINE 30 MG/ML
INJECTION, SOLUTION INTRAMUSCULAR; INTRAVENOUS
Status: DISCONTINUED | OUTPATIENT
Start: 2024-03-18 | End: 2024-03-18

## 2024-03-18 RX ORDER — SODIUM CHLORIDE 9 MG/ML
INJECTION, SOLUTION INTRAVENOUS CONTINUOUS
Status: DISCONTINUED | OUTPATIENT
Start: 2024-03-18 | End: 2024-03-18 | Stop reason: HOSPADM

## 2024-03-18 RX ORDER — MIDAZOLAM HYDROCHLORIDE 1 MG/ML
INJECTION INTRAMUSCULAR; INTRAVENOUS
Status: DISCONTINUED | OUTPATIENT
Start: 2024-03-18 | End: 2024-03-18

## 2024-03-18 RX ORDER — LIDOCAINE HYDROCHLORIDE 10 MG/ML
0.5 INJECTION, SOLUTION EPIDURAL; INFILTRATION; INTRACAUDAL; PERINEURAL ONCE
Status: DISCONTINUED | OUTPATIENT
Start: 2024-03-18 | End: 2024-03-18 | Stop reason: HOSPADM

## 2024-03-18 RX ORDER — PREGABALIN 75 MG/1
75 CAPSULE ORAL ONCE
Status: COMPLETED | OUTPATIENT
Start: 2024-03-18 | End: 2024-03-18

## 2024-03-18 RX ORDER — FAMOTIDINE 20 MG/1
20 TABLET, FILM COATED ORAL
Status: COMPLETED | OUTPATIENT
Start: 2024-03-18 | End: 2024-03-18

## 2024-03-18 RX ORDER — VASOPRESSIN 20 [USP'U]/ML
INJECTION, SOLUTION INTRAMUSCULAR; SUBCUTANEOUS
Status: DISCONTINUED | OUTPATIENT
Start: 2024-03-18 | End: 2024-03-18 | Stop reason: HOSPADM

## 2024-03-18 RX ORDER — ONDANSETRON HYDROCHLORIDE 2 MG/ML
INJECTION, SOLUTION INTRAVENOUS
Status: DISCONTINUED | OUTPATIENT
Start: 2024-03-18 | End: 2024-03-18

## 2024-03-18 RX ORDER — PROCHLORPERAZINE EDISYLATE 5 MG/ML
5 INJECTION INTRAMUSCULAR; INTRAVENOUS EVERY 30 MIN PRN
Status: DISCONTINUED | OUTPATIENT
Start: 2024-03-18 | End: 2024-03-18 | Stop reason: HOSPADM

## 2024-03-18 RX ADMIN — SODIUM CHLORIDE, SODIUM LACTATE, POTASSIUM CHLORIDE, AND CALCIUM CHLORIDE: 600; 310; 30; 20 INJECTION, SOLUTION INTRAVENOUS at 11:03

## 2024-03-18 RX ADMIN — PROPOFOL 150 MG: 10 INJECTION, EMULSION INTRAVENOUS at 12:03

## 2024-03-18 RX ADMIN — PREGABALIN 75 MG: 75 CAPSULE ORAL at 10:03

## 2024-03-18 RX ADMIN — FENTANYL CITRATE 50 MCG: 50 INJECTION, SOLUTION INTRAMUSCULAR; INTRAVENOUS at 12:03

## 2024-03-18 RX ADMIN — GLYCOPYRROLATE 0.2 MG: 0.2 INJECTION, SOLUTION INTRAMUSCULAR; INTRAVITREAL at 12:03

## 2024-03-18 RX ADMIN — LIDOCAINE HYDROCHLORIDE 100 MG: 20 INJECTION, SOLUTION INTRAVENOUS at 12:03

## 2024-03-18 RX ADMIN — ONDANSETRON HYDROCHLORIDE 4 MG: 2 INJECTION INTRAMUSCULAR; INTRAVENOUS at 12:03

## 2024-03-18 RX ADMIN — MIDAZOLAM HYDROCHLORIDE 2 MG: 1 INJECTION, SOLUTION INTRAMUSCULAR; INTRAVENOUS at 11:03

## 2024-03-18 RX ADMIN — KETOROLAC TROMETHAMINE 30 MG: 30 INJECTION, SOLUTION INTRAMUSCULAR; INTRAVENOUS at 12:03

## 2024-03-18 RX ADMIN — FAMOTIDINE 20 MG: 20 TABLET ORAL at 10:03

## 2024-03-18 RX ADMIN — DEXAMETHASONE SODIUM PHOSPHATE 4 MG: 4 INJECTION, SOLUTION INTRAMUSCULAR; INTRAVENOUS at 12:03

## 2024-03-18 RX ADMIN — MUPIROCIN: 20 OINTMENT TOPICAL at 10:03

## 2024-03-18 NOTE — PLAN OF CARE
Pt wishes for her s/o, Bhaskar, to remain on the 1st floor in the lobby.  She does not wish for him to be present when discussing her medical hx.  She is ok with him rec'g text updates and phone call from the doctor.  She is ok with him coming to the 4th floor after she is back from recovery.  I told pt we will wait until she is back and requests his presence prior to contacting him with room number.  Pt in agreement.

## 2024-03-18 NOTE — H&P
CC: Preop H&P    Marcella presents with complaint of s/p LEEP with LYNETTE 3, positive margins. Wants to proceed with surgical management of LYNETTE 3.       ROS:  GENERAL: No fever, chills, fatigability or weight loss.  VULVAR: No pain, no lesions and no itching.  VAGINAL: No relaxation, no itching, no discharge, no abnormal bleeding and no lesions.  ABDOMEN: No abdominal pain. Denies nausea. Denies vomiting. No diarrhea. No constipation  BREAST: Denies pain. No lumps. No discharge.  URINARY: No incontinence, no nocturia, no frequency and no dysuria.  CARDIOVASCULAR: No chest pain. No shortness of breath. No leg cramps.  NEUROLOGICAL: No headaches. No vision changes.    OB History          1    Para   0    Term                AB   1    Living             SAB        IAB        Ectopic        Multiple        Live Births                     Past Medical History:   Diagnosis Date    Anesthesia complication     STATES WOKE UP DURING REMOVAL OF WISDOM TEETH       Past Surgical History:   Procedure Laterality Date    WISDOM TOOTH EXTRACTION         No current facility-administered medications for this encounter.    Current Outpatient Medications:     ibuprofen (ADVIL,MOTRIN) 800 MG tablet, Take 800 mg by mouth every 8 (eight) hours as needed., Disp: , Rfl:     SH:nc  FH:nc  All:   Review of patient's allergies indicates:   Allergen Reactions    Acetaminophen Hives       Gyn Hx:  Paps: see above  STDs:none    There were no vitals filed for this visit.    PHYSICAL EXAM:  GEN: NAD  Resp: nl effort  Pelvic: see last note  Psych: nl affect  Neuro: no focal deficits  Skin: warm and dry    ASSESSMENT and PLAN:    1) Preop visit for CKC    -Pt has been verbally consented for above. R/B/A of this procedure have been discussed. Will sign consents in preop. All questions have been answered. Plan for surgery on 3/18.

## 2024-03-18 NOTE — ANESTHESIA PROCEDURE NOTES
Intubation    Date/Time: 3/18/2024 12:08 PM    Performed by: Lali Rodriguez CRNA  Authorized by: Crow Riley MD    Intubation:     Induction:  Intravenous    Intubated:  Postinduction    Mask Ventilation:  Not attempted    Attempts:  1    Attempted By:  CRNA    Difficult Airway Encountered?: No      Complications:  None    Airway Device:  Supraglottic airway/LMA    Airway Device Size:  3.0    Secured at:  The lips    Placement Verified By:  Capnometry    Complicating Factors:  None    Findings Post-Intubation:  BS equal bilateral and atraumatic/condition of teeth unchanged

## 2024-03-18 NOTE — PLAN OF CARE
Marcella Plunkett Do has met all discharge criteria from Phase II. Vital Signs are stable, ambulating  without difficulty. Discharge instructions given, patient verbalized understanding. Discharged from facility via wheelchair in stable condition.

## 2024-03-18 NOTE — DISCHARGE SUMMARY
Discharge Summary  Gynecology      Admit Date: 3/18/2024    Discharge Date and Time: 3/18/2024     Attending Physician: Jeansonne, Julie R., MD    Principal Diagnoses:   S/P conization of cervix    Active Hospital Problems    Diagnosis  POA    *S/P conization of cervix [Z98.890]  Not Applicable      Resolved Hospital Problems   No resolved problems to display.     Procedures:  Procedure(s) (LRB):  CONE BIOPSY, CERVIX, USING COLD KNIFE (N/A)    Discharged Condition: good    Hospital Course:   Marcella Plunkett Do is a 33 y.o. y.o.  female who presented on 3/18/2024 for the above-listed procedures for the treatment of HSIL. PMH is unremarkable. Patient tolerated the procedure well and was admitted for post-operative care. Post-operative course was uncomplicated.  On day of discharge (POD#0), patient was in stable condition, having met all post-operative milestones. She was urinating spontaneously, ambulating, and tolerating a regular diet without nausea/vomiting. Pain was well-controlled on oral medication. She was discharged with medications and follow up as listed below.     Consults: None    Significant Diagnostic Studies:  Recent Labs   Lab 24  0932   WBC 4.79   HGB 12.9   HCT 40.2   MCV 92         Treatments:   1. Surgery as above    Disposition: Home or Self Care    Patient Instructions:   Current Discharge Medication List        CONTINUE these medications which have NOT CHANGED    Details   ibuprofen (ADVIL,MOTRIN) 800 MG tablet Take 800 mg by mouth every 8 (eight) hours as needed.             Discharge Procedure Orders   Diet Adult Regular     No driving until:   Order Comments: No driving while taking narcotics     Pelvic Rest   Order Comments: Nothing in vagina for two weeks     Notify your health care provider if you experience any of the following:  temperature >100.4     Notify your health care provider if you experience any of the following:  persistent nausea and vomiting or diarrhea      Notify your health care provider if you experience any of the following:  redness, tenderness, or signs of infection (pain, swelling, redness, odor or green/yellow discharge around incision site)     Notify your health care provider if you experience any of the following:   Order Comments: Vaginal bleeding greater than 2 saturated pads in 1 hour.     Activity as tolerated        Follow-up Information       Jeansonne, Julie R., MD Follow up in 4 week(s).    Specialty: Obstetrics and Gynecology  Why: post-op f/u appt  Contact information:  [address] [id number] [phone]                             Luis Lindquist MD MS  OB/Gyn  PGY-1

## 2024-03-18 NOTE — ANESTHESIA POSTPROCEDURE EVALUATION
Anesthesia Post Evaluation    Patient: Marcella Dimitrios Do    Procedure(s) Performed: Procedure(s) (LRB):  CONE BIOPSY, CERVIX, USING COLD KNIFE (N/A)    Final Anesthesia Type: general      Patient location during evaluation: PACU  Patient participation: Yes- Able to Participate  Level of consciousness: awake and alert  Post-procedure vital signs: reviewed and stable  Pain management: adequate  Airway patency: patent    PONV status at discharge: No PONV  Anesthetic complications: no      Cardiovascular status: blood pressure returned to baseline  Respiratory status: unassisted, spontaneous ventilation and room air  Hydration status: euvolemic  Follow-up not needed.          Vitals Value Taken Time   /62 03/18/24 1316   Temp 36.4 °C (97.5 °F) 03/18/24 1242   Pulse 68 03/18/24 1329   Resp 16 03/18/24 1315   SpO2 100 % 03/18/24 1329   Vitals shown include unvalidated device data.      No case tracking events are documented in the log.      Pain/Teja Score: Teja Score: 9 (3/18/2024  1:12 PM)

## 2024-03-18 NOTE — TRANSFER OF CARE
"Anesthesia Transfer of Care Note    Patient: Marcella Dimitrios Do    Procedure(s) Performed: Procedure(s) (LRB):  CONE BIOPSY, CERVIX, USING COLD KNIFE (N/A)    Patient location: PACU    Anesthesia Type: general    Transport from OR: Transported from OR on 2-3 L/min O2 by NC with adequate spontaneous ventilation    Post pain: adequate analgesia    Post assessment: no apparent anesthetic complications and tolerated procedure well    Post vital signs: stable    Level of consciousness: alert and awake    Nausea/Vomiting: no nausea/vomiting    Complications: none    Transfer of care protocol was followed      Last vitals: Visit Vitals  /60 (BP Location: Right arm, Patient Position: Lying)   Pulse 71   Temp 36.4 °C (97.5 °F) (Skin)   Resp 18   Ht 5' 2" (1.575 m)   Wt 52.2 kg (115 lb)   LMP 03/01/2024 (Exact Date)   SpO2 100%   Breastfeeding No   BMI 21.03 kg/m²     "

## 2024-03-18 NOTE — OP NOTE
Operative Report    Procedure: CKC, ECC    Date of Surgery 03/18/2024    Pre-Op Diagnosis:   1. HSIL    Post-op Diagnosis:  Same, s/p cold knife conization    Complications: None    EBL: 10 cc     IVF: per anesthesia    Urine Output: 200 cc via in/out cath prior to start of procedure     Specimen: Ectocervical conization, ECC    Findings/Key Components:  1. Normal appearing external female genitalia  2. Normal appearing vaginal and cervical mucosa, free of lesions  3. Cervix descends to within 2-3 cm of the introitus with traction - would be  reasonable candidate for TVH if hysterectomy is indicated in the future  4. Two figure-of- eight stay sutures placed at 3 and 9 o'clock positions on cervix  5. Cervix injected with 5 cc of 10 in 50 vasopressin  6. Lugol solution application to cervix; diffuse areas with no uptake across entire surface of cervix  7. Cold-knife cone specimen obtained with 11-blade; specimen to pathology  8. ECC performed; specimen to pathology  9. Cervical cone bed cauterized utilizing Bovie cautery to ensure adequate hemostasis  10. Tenaculum puncture sites hemostatic at the conclusion of the procedure    DETAILED DESCRIPTION OF PROCEDURE:   Patient was taken to the operating room where general anesthesia was administered and found to be adequate.  She was placed in the dorsal lithotomy position using Allenstirrups, then prepped and draped in the usual sterile fashion. Bladder was drained via in-and-out catheterization prior to procedure.  A surgical timeout was performed with patient's name, date of birth, procedure to be performed, and allergies verbalized. All OR staff in agreement. No preoperative antibiotics were administered as none were indicated.    Attention was then turned to the vagina where a weighted sterile speculum was placed in the posterior aspect, and a right angle retractor was placed in the anterior aspect. The anterior lip of the cervix was grasped with a single tooth  tenaculum. Two sutures of 0 vicryl were used to ligate the cervical branches of the uterine artery at the 3 and 9 o'clock positions in a figure-of-eight fashion. Then, the cervix was injected with 5cc of 10 in 50 of vasopressin. Lugol solution was applied to the cervix to see any abnormalities; diffuse areas with no uptake were seen across entire surface of cervix. Cold-knife cone specimen was then obtained with an 11-blade. This specimen was handed off for pathologic review. Stitch was placed at the 12 o'clock position for pathological analysis.    The base and margins of the cervical cone bed was then cauterized using Bovie cautery. Monsel's was applied over this.  The cone site was noted to be hemostatic. The tails of the stay sutures were tied together and cut. The tenaculum was then removed and hemostasis was noted at the puncture sites in the cervix.  All sponge, lap and needle counts were correct x2. The patient tolerated the procedure well, was awakened and transferred to the recovery room in stable condition.      Luis Lindquist MD MS  OB/Gyn  PGY-1

## 2024-03-19 ENCOUNTER — TELEPHONE (OUTPATIENT)
Dept: OBSTETRICS AND GYNECOLOGY | Facility: CLINIC | Age: 34
End: 2024-03-19
Payer: COMMERCIAL

## 2024-03-19 VITALS
OXYGEN SATURATION: 100 % | WEIGHT: 115 LBS | TEMPERATURE: 98 F | HEART RATE: 64 BPM | BODY MASS INDEX: 21.16 KG/M2 | SYSTOLIC BLOOD PRESSURE: 117 MMHG | HEIGHT: 62 IN | RESPIRATION RATE: 16 BRPM | DIASTOLIC BLOOD PRESSURE: 66 MMHG

## 2024-03-21 LAB
FINAL PATHOLOGIC DIAGNOSIS: NORMAL
GROSS: NORMAL
Lab: NORMAL

## 2024-03-26 ENCOUNTER — PATIENT MESSAGE (OUTPATIENT)
Dept: OBSTETRICS AND GYNECOLOGY | Facility: CLINIC | Age: 34
End: 2024-03-26
Payer: COMMERCIAL

## 2024-04-18 ENCOUNTER — OFFICE VISIT (OUTPATIENT)
Dept: OBSTETRICS AND GYNECOLOGY | Facility: CLINIC | Age: 34
End: 2024-04-18
Payer: COMMERCIAL

## 2024-04-18 VITALS
WEIGHT: 120.13 LBS | DIASTOLIC BLOOD PRESSURE: 62 MMHG | SYSTOLIC BLOOD PRESSURE: 90 MMHG | BODY MASS INDEX: 22.11 KG/M2 | HEIGHT: 62 IN

## 2024-04-18 DIAGNOSIS — D06.9 HIGH GRADE SQUAMOUS INTRAEPITHELIAL LESION (HGSIL), GRADE 3 CIN, ON BIOPSY OF CERVIX: ICD-10-CM

## 2024-04-18 DIAGNOSIS — Z09 POSTOP CHECK: Primary | ICD-10-CM

## 2024-04-18 PROCEDURE — 99024 POSTOP FOLLOW-UP VISIT: CPT | Mod: S$GLB,,, | Performed by: OBSTETRICS & GYNECOLOGY

## 2024-04-18 PROCEDURE — 99999 PR PBB SHADOW E&M-EST. PATIENT-LVL III: CPT | Mod: PBBFAC,,, | Performed by: OBSTETRICS & GYNECOLOGY

## 2024-04-18 PROCEDURE — 3078F DIAST BP <80 MM HG: CPT | Mod: CPTII,S$GLB,, | Performed by: OBSTETRICS & GYNECOLOGY

## 2024-04-18 PROCEDURE — 1160F RVW MEDS BY RX/DR IN RCRD: CPT | Mod: CPTII,S$GLB,, | Performed by: OBSTETRICS & GYNECOLOGY

## 2024-04-18 PROCEDURE — 1159F MED LIST DOCD IN RCRD: CPT | Mod: CPTII,S$GLB,, | Performed by: OBSTETRICS & GYNECOLOGY

## 2024-04-18 PROCEDURE — 3074F SYST BP LT 130 MM HG: CPT | Mod: CPTII,S$GLB,, | Performed by: OBSTETRICS & GYNECOLOGY

## 2024-04-18 PROCEDURE — 3008F BODY MASS INDEX DOCD: CPT | Mod: CPTII,S$GLB,, | Performed by: OBSTETRICS & GYNECOLOGY

## 2024-04-18 NOTE — PROGRESS NOTES
Marcella Plunkett Do is a 33 y.o. female, postop 1 month s/p LEEP for LYNETTE 3 who presents for routine follow up. She has no complaints today. She denies any pain, VB, purulent VD, n/v/d/c.     Pathology: LYNETTE 3 with negative margins    ROS: No Abd/vaginal pain, VB, VD, fevers, chills, nausea, vomiting, diarrhea    PE:  Vitals:    04/18/24 0832   BP: 90/62     Gen: NAD  Resp: nl effort  Abd: Soft, NT, Incision: soft and dry, no erythema or edema, no drainage  Pelvic: SSE: cervic well healed, physiologic VD noted, no purulent drainage, no VB. Stay suture still in situ  Skin: warm and dry  Psych: nl affect  Neuro: Cns intact    A/P:  1) Routine Postop visit s/p LEEP  -Patient doing well, follow up 6 months for cotest. Precautions given.

## 2024-10-14 ENCOUNTER — CLINICAL SUPPORT (OUTPATIENT)
Dept: OTHER | Facility: CLINIC | Age: 34
End: 2024-10-14
Payer: COMMERCIAL

## 2024-10-14 DIAGNOSIS — Z00.8 ENCOUNTER FOR OTHER GENERAL EXAMINATION: ICD-10-CM

## 2024-10-17 VITALS
DIASTOLIC BLOOD PRESSURE: 68 MMHG | WEIGHT: 118 LBS | HEIGHT: 62 IN | SYSTOLIC BLOOD PRESSURE: 111 MMHG | BODY MASS INDEX: 21.71 KG/M2

## 2024-10-17 LAB
GLUCOSE SERPL-MCNC: 101 MG/DL (ref 60–140)
HDLC SERPL-MCNC: 55 MG/DL
POC CHOLESTEROL, LDL (DOCK): 123 MG/DL
POC CHOLESTEROL, TOTAL: 193 MG/DL
TRIGL SERPL-MCNC: 80 MG/DL

## 2024-10-22 ENCOUNTER — OFFICE VISIT (OUTPATIENT)
Dept: OBSTETRICS AND GYNECOLOGY | Facility: CLINIC | Age: 34
End: 2024-10-22
Payer: COMMERCIAL

## 2024-10-22 VITALS
BODY MASS INDEX: 21.91 KG/M2 | HEIGHT: 62 IN | WEIGHT: 119.06 LBS | DIASTOLIC BLOOD PRESSURE: 67 MMHG | SYSTOLIC BLOOD PRESSURE: 105 MMHG

## 2024-10-22 DIAGNOSIS — Z98.890 HISTORY OF LOOP ELECTRICAL EXCISION PROCEDURE (LEEP): ICD-10-CM

## 2024-10-22 DIAGNOSIS — Z01.419 ENCOUNTER FOR ANNUAL ROUTINE GYNECOLOGICAL EXAMINATION: Primary | ICD-10-CM

## 2024-10-22 PROCEDURE — 99999 PR PBB SHADOW E&M-EST. PATIENT-LVL III: CPT | Mod: PBBFAC,,, | Performed by: OBSTETRICS & GYNECOLOGY

## 2024-10-22 PROCEDURE — 3078F DIAST BP <80 MM HG: CPT | Mod: CPTII,S$GLB,, | Performed by: OBSTETRICS & GYNECOLOGY

## 2024-10-22 PROCEDURE — 87624 HPV HI-RISK TYP POOLED RSLT: CPT | Performed by: OBSTETRICS & GYNECOLOGY

## 2024-10-22 PROCEDURE — 3008F BODY MASS INDEX DOCD: CPT | Mod: CPTII,S$GLB,, | Performed by: OBSTETRICS & GYNECOLOGY

## 2024-10-22 PROCEDURE — 3074F SYST BP LT 130 MM HG: CPT | Mod: CPTII,S$GLB,, | Performed by: OBSTETRICS & GYNECOLOGY

## 2024-10-22 PROCEDURE — 1160F RVW MEDS BY RX/DR IN RCRD: CPT | Mod: CPTII,S$GLB,, | Performed by: OBSTETRICS & GYNECOLOGY

## 2024-10-22 PROCEDURE — 99395 PREV VISIT EST AGE 18-39: CPT | Mod: S$GLB,,, | Performed by: OBSTETRICS & GYNECOLOGY

## 2024-10-22 PROCEDURE — 1159F MED LIST DOCD IN RCRD: CPT | Mod: CPTII,S$GLB,, | Performed by: OBSTETRICS & GYNECOLOGY

## 2024-10-22 NOTE — PROGRESS NOTES
SUBJECTIVE:     Chief Complaint: Well Woman       History of Present Illness:  Annual Exam  Patient presents for annual exam.   She c/o nothing today. Would like routine labs.   LMP: 24  She denies any vd, vb, dyspareunia, dysuria, depression, anxiety.  Birth Control: none, declines  none STD testing.     GYN screening history:  LEEP and CKC last year.   Mammogram history: none  Colonoscopy history: none  Dexa history: none    FH:   Breast cancer: none  Colon cancer: none  Ovarian cancer: none    Review of patient's allergies indicates:   Allergen Reactions    Acetaminophen Hives       Past Medical History:   Diagnosis Date    Anesthesia complication     STATES WOKE UP DURING REMOVAL OF WISDOM TEETH     Past Surgical History:   Procedure Laterality Date    COLD KNIFE CONIZATION OF CERVIX N/A 3/18/2024    Procedure: CONE BIOPSY, CERVIX, USING COLD KNIFE;  Surgeon: Jeansonne, Julie R., MD;  Location: [facility];  Service: OB/GYN;  Laterality: N/A;    WISDOM TOOTH EXTRACTION       OB History          1    Para   0    Term                AB   1    Living             SAB        IAB        Ectopic        Multiple        Live Births                   No family history on file.  Social History     Tobacco Use    Smoking status: Never     Passive exposure: Never   Substance Use Topics    Alcohol use: Yes     Comment: NONE 24 HR PRIOR TO SURG    Drug use: Not Currently       Current Outpatient Medications   Medication Sig    ibuprofen (ADVIL,MOTRIN) 800 MG tablet Take 800 mg by mouth every 8 (eight) hours as needed.     No current facility-administered medications for this visit.       Review of Systems:  GENERAL: No fever, chills, fatigability or weight loss.  CARDIOVASCULAR: No chest pain. No palpitations.  RESPIRATORY: No SOB, no wheezing.  BREAST: Denies pain. No lumps. No discharge.  VULVAR: No pain, no lesions and no itching.  VAGINAL: No relaxation, no itching, no discharge, no abnormal bleeding and  no lesions.  ABDOMEN: No abdominal pain. Denies nausea. Denies vomiting. No diarrhea. No constipation  URINARY: No incontinence, no nocturia, no frequency and no dysuria.  NEUROLOGICAL: No headaches. No vision changes.       OBJECTIVE:     Vitals:    10/22/24 0849   BP: 105/67       Patient verbally consents to pelvic and breast exams. Female chaperone present for exams.   Physical Exam:  Gen: NAD, well developed, well-nourished  HEENT: Normocephalic, atraumatic  Eyes: EOM nl, conjuntivae normal  Neck: ROM normal, no thyromegaly  Respiratory: Effort normal   Abd: soft, nontender, no masses palpated  Breast: Normal bilaterally, no masses, lesions or tenderness. No nipple discharge on expression, no lymphadenopathy bilaterally.  SSE:  Vulva: no lesions or rashes  Cervix: No lesions noted, nonfriable, no vaginal discharge or vaginal bleeding noted  BME:   Cervix: No CMT  Adnexa: nl bilaterally, no masses or fullness palpated  Uterus: normal, nonenlarged  Musculoskeletal: normal ROM  Neuro: alert, AAOx3  Skin: warm and dry  Psych: mood/affect nl, behavior normal, judgement normal, thought content normal        ASSESSMENT:       ICD-10-CM ICD-9-CM    1. Encounter for annual routine gynecological examination  Z01.419 V72.31 Liquid-Based Pap Smear, Screening      HPV High Risk Genotypes, PCR      TSH      Hemoglobin A1C      LIPID PANEL      CBC Auto Differential      2. History of loop electrical excision procedure (LEEP)  Z98.890 V45.89              Plan:      [facility] was seen [facility] for well woman.    Diagnoses and all orders for this visit:    Encounter for annual routine gynecological examination  -     Liquid-Based Pap Smear, Screening  -     HPV High Risk Genotypes, PCR  -     TSH; Future  -     Hemoglobin A1C; Future  -     LIPID PANEL; Future  -     CBC Auto Differential; Future    History of loop electrical excision procedure (LEEP)        Orders Placed This Encounter   Procedures    HPV High Risk Genotypes, PCR     TSH    Hemoglobin A1C    LIPID PANEL    CBC Auto Differential       Follow up in one year for annual, or prn.    Julie R Jeansonne

## 2024-10-23 LAB
HPV HR 12 DNA SPEC QL NAA+PROBE: NEGATIVE
HPV16 AG SPEC QL: NEGATIVE
HPV18 DNA SPEC QL NAA+PROBE: NEGATIVE

## (undated) DEVICE — COUNT NDL FOAM MAGNET 40COUNT

## (undated) DEVICE — SOL POVIDONE PREP IODINE 4 OZ

## (undated) DEVICE — PENCIL ELECTROSURG HOLST W/BLD

## (undated) DEVICE — Device

## (undated) DEVICE — GLOVE BIOGEL SKINSENSE PI 6.5

## (undated) DEVICE — BLADE SURG STAINLESS STEEL #10

## (undated) DEVICE — UNDERGLOVE BIOGEL PI SZ 6.5 LF

## (undated) DEVICE — PAD PREP CUFFED NS 24X48IN

## (undated) DEVICE — ELECTRODE REM PLYHSV RETURN 9

## (undated) DEVICE — BLADE SURG STAINLESS STEEL #11

## (undated) DEVICE — SUT 0 27IN CHROMIC GUT CT-1

## (undated) DEVICE — SOL POVIDONE SCRUB IODINE 4 OZ

## (undated) DEVICE — SOL IRR SOD CHL .9% POUR

## (undated) DEVICE — SWAB PROCTO 16 X 1 1/2 ST 2/PK